# Patient Record
Sex: FEMALE | Race: WHITE | NOT HISPANIC OR LATINO | Employment: OTHER | URBAN - METROPOLITAN AREA
[De-identification: names, ages, dates, MRNs, and addresses within clinical notes are randomized per-mention and may not be internally consistent; named-entity substitution may affect disease eponyms.]

---

## 2023-07-03 DIAGNOSIS — F32.A ANXIETY AND DEPRESSION: Primary | ICD-10-CM

## 2023-07-03 DIAGNOSIS — F41.9 ANXIETY AND DEPRESSION: Primary | ICD-10-CM

## 2023-07-03 RX ORDER — AMOXICILLIN 250 MG/1
CAPSULE ORAL
COMMUNITY
Start: 2023-05-13

## 2023-07-03 RX ORDER — AZITHROMYCIN 250 MG/1
TABLET, FILM COATED ORAL
COMMUNITY
Start: 2022-09-26 | End: 2023-07-03

## 2023-07-03 RX ORDER — CYCLOBENZAPRINE HCL 10 MG
10 TABLET ORAL NIGHTLY
COMMUNITY
Start: 2023-05-23

## 2023-07-03 RX ORDER — IBUPROFEN 600 MG/1
600 TABLET ORAL EVERY 6 HOURS
COMMUNITY
Start: 2023-05-23

## 2023-07-03 RX ORDER — ALBUTEROL SULFATE 90 UG/1
AEROSOL, METERED RESPIRATORY (INHALATION)
COMMUNITY
Start: 2022-10-05

## 2023-07-03 RX ORDER — HYDROCODONE BITARTRATE AND ACETAMINOPHEN 5; 325 MG/1; MG/1
TABLET ORAL
COMMUNITY

## 2023-07-03 RX ORDER — ESCITALOPRAM OXALATE 5 MG/1
5 TABLET ORAL DAILY
Qty: 90 TABLET | Refills: 0 | Status: SHIPPED | OUTPATIENT
Start: 2023-07-03 | End: 2023-10-04 | Stop reason: SDUPTHER

## 2023-07-03 RX ORDER — GLUCOSAMINE HCL 500 MG
TABLET ORAL
COMMUNITY
Start: 2022-05-24

## 2023-07-03 RX ORDER — ESCITALOPRAM OXALATE 5 MG/1
1 TABLET ORAL DAILY
COMMUNITY
Start: 2022-05-23 | End: 2023-07-03 | Stop reason: SDUPTHER

## 2023-07-03 RX ORDER — NITROFURANTOIN 25; 75 MG/1; MG/1
100 CAPSULE ORAL EVERY 12 HOURS
Qty: 10 CAPSULE | Refills: 0 | COMMUNITY
Start: 2022-11-08 | End: 2022-11-13

## 2023-07-03 RX ORDER — PREDNISONE 20 MG/1
2 TABLET ORAL DAILY
COMMUNITY
Start: 2022-10-05

## 2023-07-03 NOTE — TELEPHONE ENCOUNTER
Patient says she has been taking 10mg instead of 5mg of the Lexapro for 3 days. She needs a refill and would like it to be for the new dosage of 10mg, as she has been doing and feeling well on the dosage.

## 2023-08-21 ENCOUNTER — HOSPITAL ENCOUNTER (OUTPATIENT)
Dept: DATA CONVERSION | Facility: HOSPITAL | Age: 43
Discharge: HOME | End: 2023-08-21
Payer: COMMERCIAL

## 2023-08-21 DIAGNOSIS — S52.125A NONDISPLACED FRACTURE OF HEAD OF LEFT RADIUS, INITIAL ENCOUNTER FOR CLOSED FRACTURE: ICD-10-CM

## 2023-08-21 DIAGNOSIS — S42.452A DISPLACED FRACTURE OF LATERAL CONDYLE OF LEFT HUMERUS, INITIAL ENCOUNTER FOR CLOSED FRACTURE: ICD-10-CM

## 2023-10-04 ENCOUNTER — OFFICE VISIT (OUTPATIENT)
Dept: PRIMARY CARE | Facility: CLINIC | Age: 43
End: 2023-10-04
Payer: COMMERCIAL

## 2023-10-04 VITALS
HEART RATE: 74 BPM | OXYGEN SATURATION: 99 % | WEIGHT: 117 LBS | SYSTOLIC BLOOD PRESSURE: 110 MMHG | BODY MASS INDEX: 18.88 KG/M2 | DIASTOLIC BLOOD PRESSURE: 70 MMHG

## 2023-10-04 DIAGNOSIS — Z12.31 ENCOUNTER FOR SCREENING MAMMOGRAM FOR MALIGNANT NEOPLASM OF BREAST: ICD-10-CM

## 2023-10-04 DIAGNOSIS — F32.A ANXIETY AND DEPRESSION: Primary | ICD-10-CM

## 2023-10-04 DIAGNOSIS — Z23 ENCOUNTER FOR IMMUNIZATION: ICD-10-CM

## 2023-10-04 DIAGNOSIS — F41.9 ANXIETY AND DEPRESSION: Primary | ICD-10-CM

## 2023-10-04 PROBLEM — J43.9 EMPHYSEMA LUNG (MULTI): Status: ACTIVE | Noted: 2023-10-04

## 2023-10-04 PROBLEM — H91.90 HEARING LOSS: Status: ACTIVE | Noted: 2023-10-04

## 2023-10-04 PROBLEM — F41.8 MIXED ANXIETY DEPRESSIVE DISORDER: Status: ACTIVE | Noted: 2023-10-04

## 2023-10-04 PROBLEM — G43.909 MIGRAINE HEADACHE: Status: ACTIVE | Noted: 2023-10-04

## 2023-10-04 PROBLEM — E78.5 HYPERLIPIDEMIA: Status: ACTIVE | Noted: 2023-10-04

## 2023-10-04 PROCEDURE — 1036F TOBACCO NON-USER: CPT | Performed by: INTERNAL MEDICINE

## 2023-10-04 PROCEDURE — 99214 OFFICE O/P EST MOD 30 MIN: CPT | Performed by: INTERNAL MEDICINE

## 2023-10-04 PROCEDURE — 90686 IIV4 VACC NO PRSV 0.5 ML IM: CPT | Performed by: INTERNAL MEDICINE

## 2023-10-04 PROCEDURE — 90471 IMMUNIZATION ADMIN: CPT | Performed by: INTERNAL MEDICINE

## 2023-10-04 RX ORDER — ESCITALOPRAM OXALATE 5 MG/1
5 TABLET ORAL DAILY
Qty: 90 TABLET | Refills: 3 | Status: SHIPPED | OUTPATIENT
Start: 2023-10-04

## 2023-10-04 NOTE — PROGRESS NOTES
"Subjective   Patient ID: Robina Kimble is a 42 y.o. female who presents for Follow-up (Patient here for refills).    HPI   \"I feel well\", refill request, inquiring about PAP/HPV results from 6/23 done by her GYN.  Review of Systems  negative  Objective   Pulse 74   Wt 53.1 kg (117 lb)   LMP 09/25/2023 (Approximate)   SpO2 99%   BMI 18.88 kg/m²     Physical Exam  NAD. Cooperative.  Lungs CTA  Heart: RRR      Assessment/Plan   Diagnoses and all orders for this visit:  Anxiety and depression  -     escitalopram (Lexapro) 5 mg tablet; Take 1 tablet (5 mg) by mouth once daily.  Encounter for immunization  Encounter for screening mammogram for malignant neoplasm of breast  -     BI mammo bilateral screening tomosynthesis; Future  Other orders  -     Flu vaccine (IIV4) age 6 months and greater, preservative free    Reviewed and discussed 6/23 GYN results, WNL.     "

## 2023-12-18 ENCOUNTER — ANCILLARY PROCEDURE (OUTPATIENT)
Dept: RADIOLOGY | Facility: CLINIC | Age: 43
End: 2023-12-18
Payer: COMMERCIAL

## 2023-12-18 DIAGNOSIS — Z12.31 ENCOUNTER FOR SCREENING MAMMOGRAM FOR MALIGNANT NEOPLASM OF BREAST: ICD-10-CM

## 2023-12-18 PROCEDURE — 77063 BREAST TOMOSYNTHESIS BI: CPT | Mod: BILATERAL PROCEDURE | Performed by: RADIOLOGY

## 2023-12-18 PROCEDURE — 77067 SCR MAMMO BI INCL CAD: CPT | Mod: BILATERAL PROCEDURE | Performed by: RADIOLOGY

## 2023-12-18 PROCEDURE — 77067 SCR MAMMO BI INCL CAD: CPT

## 2024-01-02 ENCOUNTER — APPOINTMENT (OUTPATIENT)
Dept: OTOLARYNGOLOGY | Facility: HOSPITAL | Age: 44
End: 2024-01-02
Payer: COMMERCIAL

## 2024-01-09 ENCOUNTER — APPOINTMENT (OUTPATIENT)
Dept: OTOLARYNGOLOGY | Facility: CLINIC | Age: 44
End: 2024-01-09
Payer: COMMERCIAL

## 2024-01-17 ENCOUNTER — APPOINTMENT (OUTPATIENT)
Dept: PRIMARY CARE | Facility: CLINIC | Age: 44
End: 2024-01-17
Payer: COMMERCIAL

## 2024-01-17 ENCOUNTER — TELEMEDICINE (OUTPATIENT)
Dept: PRIMARY CARE | Facility: CLINIC | Age: 44
End: 2024-01-17
Payer: COMMERCIAL

## 2024-01-17 DIAGNOSIS — J43.9 PULMONARY EMPHYSEMA, UNSPECIFIED EMPHYSEMA TYPE (MULTI): Primary | ICD-10-CM

## 2024-01-17 PROCEDURE — 99213 OFFICE O/P EST LOW 20 MIN: CPT | Performed by: INTERNAL MEDICINE

## 2024-01-17 NOTE — PROGRESS NOTES
"Subjective   Patient ID: Robina Kimble is a 43 y.o. female who presents for Follow-up (Video visit for referral).    HPI   Recently was diagnosed and treated with respiratory infections, and two episodes of streptococcal tonsillitis, \"I still do not feel well\". Scheduled consult with ENT.  In light of emphysema interested in re-establishing with a Pulmonologist.      Assessment/Plan   Diagnoses and all orders for this visit:  Pulmonary emphysema, unspecified emphysema type (CMS/HCC)  -     Referral to Pulmonology; Future    Discussed and recommended immunizations, Pneumococcal conjugate vaccine (PCV) 20, Pneumococcal polysaccharide vaccine (PPSV or PPV-23) in 8 weeks, RSV vaccine, and the COVID booster update.       "

## 2024-01-24 ENCOUNTER — APPOINTMENT (OUTPATIENT)
Dept: OTOLARYNGOLOGY | Facility: CLINIC | Age: 44
End: 2024-01-24
Payer: COMMERCIAL

## 2024-02-05 ENCOUNTER — APPOINTMENT (OUTPATIENT)
Dept: OTOLARYNGOLOGY | Facility: CLINIC | Age: 44
End: 2024-02-05
Payer: COMMERCIAL

## 2024-02-12 NOTE — PROGRESS NOTES
Patient: Robina Kimble    [id number]  : 1980 -- AGE 43 y.o.    Provider: Camila FITCH [facility]     Location [facility]   Service Date: 24          [facility]  New Visit Note    HISTORY OF PRESENT ILLNESS     The patient's referring provider is: Gwendolyn Gomez, *    HISTORY OF PRESENT ILLNESS   Robina Kimble is a 43 y.o. female who is a former cigarette smoker, currently vapes e-cigarettes (20 pack years), who presents to a [facility] for an initial evaluation for Emphysema.     I have independently interviewed and examined the patient in the office and reviewed available records.    Current History    On today's visit, the patient reports she has a dry cough with occasional chest tightnes, (chest tightness happened more often when she smoked cigarettes). She quit smoking 2 years ago but has been vaping e-cigarettes since. She was prescribed an albuterol inhaler a couple years back for pneumonia but never used it. She denies wheezing, KING, and ER visits for breathing issues.    Previous pulmonary history: Pneumonia. Emphysema    Inhalers/nebulized medications: albuterol    Hospitalization History: She has not been hospitalized over the last year for breathing related problem.    Sleep history: Complains of snoring. Denies apnea, feeling tired during the day, and taking naps during the day.       ALLERGIES AND MEDICATIONS     ALLERGIES  No Known Allergies    MEDICATIONS  Current Outpatient Medications   Medication Sig Dispense Refill    albuterol 90 mcg/actuation inhaler Inhale.      amoxicillin (Amoxil) 250 mg capsule TAKE 1 CAPSULE BY MOUTH THREE TIMES DAILY UNTIL ALL TAKEN.      cholecalciferol, vitamin D3, 75 mcg (3,000 unit) tablet Take by mouth.      cyclobenzaprine (Flexeril) 10 mg tablet Take 1 tablet (10 mg) by mouth once daily at bedtime.      escitalopram (Lexapro) 5 mg tablet Take 1 tablet (5 mg)  by mouth once daily. 90 tablet 3    HYDROcodone-acetaminophen (Norco) 5-325 mg tablet TAKE 1 TABLET BY MOUTH EVERY 4 HOURS FOR 3 DAYS      ibuprofen 600 mg tablet Take 1 tablet (600 mg) by mouth every 6 hours.      predniSONE (Deltasone) 20 mg tablet Take 2 tablets (40 mg) by mouth once daily.       No current facility-administered medications for this visit.         PAST HISTORY     PAST MEDICAL HISTORY  Anxiety/depression  Emphysema    PAST SURGICAL HISTORY  Past Surgical History:   Procedure Laterality Date    OTHER SURGICAL HISTORY  10/02/2019    No history of surgery       IMMUNIZATION HISTORY  Immunization History   Administered Date(s) Administered    Flu vaccine (IIV4), preservative free *Check age/dose* 10/04/2023    Influenza, Injectable, MDCK, preservative free 10/01/2023    Influenza, seasonal, injectable 10/05/2022    Pfizer Purple Cap SARS-CoV-2 03/10/2021, 2021       SOCIAL HISTORY  Tobacco Smokin- 42 y/o -3/4 pack/day - currently vaping e-cigarettes  Illicit drugs: none  Alcohol consumption: socailly  Pets: dog    OCCUPATIONAL/ENVIRONMENTAL HISTORY  Occupation: hair /, chemicals and aerosols.  No known exposure to asbestos, silica, beryllium or inhaled metals.  No exposure to birds or exotic animals.    FAMILY HISTORY  Family History   Problem Relation Name Age of Onset    Breast cancer Maternal Grandmother  60     Maternal grandmother-  lung cancer  No family history of pulmonary disease  No family history of autoimmune disorders.    REVIEW OF SYSTEMS     REVIEW OF SYSTEMS  Review of Systems    Constitutional: No fever, no chills, no night sweats.    Eyes: No double vision, no floaters, no dry eyes.   ENT: See HPI.   Neck: No neck stiffness.  Cardiovascular: No sharp chest pain, no heart racing, no leg swelling.  Respiratory: as noted in HPI.   Gastrointestinal: No nausea, no vomiting, no diarrhea.   Musculoskeletal: No joint pain, no back pain.   Integumentary: No  rashes or sores.  Neurological: No dizziness, no headaches. Sleeping well.  Psychiatric: No mood changes.   Endocrine: No hot flashes, no cold intolerance, weight is stable.  Hematologic: No easy bruising or bleeding.    PHYSICAL EXAM     VITAL SIGNS:   Vitals:    02/14/24 0912   BP: 114/80   Pulse: 85   Resp: 18   Temp: 36.7 °C (98.1 °F)   SpO2: 99%          CURRENT WEIGHT: Body mass index is 20.07 kg/m².    PREVIOUS WEIGHTS:  Wt Readings from Last 3 Encounters:   10/04/23 53.1 kg (117 lb)   06/21/23 53.1 kg (117 lb)   12/14/22 53.6 kg (118 lb 3 oz)       Physical Exam    Constitutional: General appearance: Alert and oriented.  No acute distress. Well developed, well nourished.  Head and face: Symmetric  ENT: external inspection of ear and nose normal. No intranasal polyps. No oropharyngeal exudates.    Oropharynx: normal   Neck: supple, no lymphadenopathy  Pulmonary: Chest is normal. No increased work of breathing or signs of respiratory distress. Clear to auscultation bilaterally - no crackles, wheezing, or rhonchi.   Cardiovascular: Heart rate and rhythm normal. Normal S1, S2 - no murmurs, gallops, or pericardial rub.   Abdomen: Soft, non tender, +BS  Extremities: No edema. No clubbing or cyanosis of the fingernails.    Neurologic: Moves all four extremities   MSK: Normal movements of extremities. Gait normal   Psychiatric: Intact judgement and insight.    RESULTS/DATA     Pulmonary Function Test Results     None on record    Chest Radiograph     IMPRESSION:    No lung infiltrate.  Focal opacity in the right lower zone of undetermined etiology.  This  might be small infiltrate or atelectasis but a nodule is not excluded.    Follow-up is recommended with nipple marker films    Incidental Finding:  Follow-up  Acuity: Incidental  Finding: Indeterminant appearing incidentally detected nodular lung  density on CXR  Recommendation: XR CHEST 2V FRONTAL/LAT  Time Frame: in 4-6 weeks    COMMUNICATION:? Results will be  communicated with the ordering provider  via Epic staff message by [employer] within 2 business days  of report finalization.    : PSCB    Transcribe Date/Time: Sep 26 2022 10:28A    Dictated by : RUDY SETHI MD    This examination was interpreted and the report reviewed and  electronically signed by:  RUDY SETHI MD on Sep 26 2022 10:31AM  EST  Narrative    * * *Final Report* * *    DATE OF EXAM: Sep 26 2022  9:37AM      LYX   5291  -  XR CHEST 2V FRONTAL/LAT  / ACCESSION #  [id number]    PROCEDURE REASON: Acute cough        * * * * Physician Interpretation * * * *     EXAMINATION:  CHEST RADIOGRAPH (2 VIEW FRONTAL & LATERAL)    CLINICAL HISTORY: Acute cough  MQ:  [id number]    EXAM DATE/TIME:  9/26/2022 9:37 AM    COMPARISON:  No relevant prior studies available.      RESULT:    Lines, tubes, and devices:  None.    Lungs and pleura:  No consolidation. No lung mass. No pleural effusion.  No pneumothorax.  Symmetric nodular opacities in the lower zones consistent with nipple  shadow.  Additionally there is another nodular opacity in the right lower  zone inferior to the right nipple shadow.    Cardiomediastinal silhouette:  Normal cardiomediastinal silhouette.    Bones and soft tissues:  Unremarkable.  Chest CT Scan     Narrative & Impression  MRN: [id number]  Patient Name: MACY GALINDO     STUDY:  CT CHEST W CONTRAST;  10/17/2022 8:52 am     INDICATION:  .  F17.200: Nicotine dependence R93.89: Abnormal chest xray.     COMPARISON:  None.     ACCESSION NUMBER(S):  [id number]     ORDERING CLINICIAN:  COOKIE MONROY     TECHNIQUE:  Helical data acquisition of the chest was obtained  without IV  contrast material.  Images were reformatted in axial, coronal, and  sagittal planes.     FINDINGS:  Emphysema and findings of smoking related airway disease with  bronchial thickening. Mild apical scarring. No suspicious nodules. No  pneumothorax. No pleural effusion. No pericardial effusion.  "No  aneurysm. No adenopathy.     IMPRESSION:  1.  Emphysema. No evidence of infiltrate or focal findings. No  suspicious pulmonary nodules. Follow-up is advised if the patient  meets criteria for lung cancer screening CT program.      Echocardiogram     No results found       Labwork   Complete Blood Count  Lab Results   Component Value Date    WBC 6.2 05/23/2022    HGB 14.2 05/23/2022    HCT 41.1 05/23/2022    MCV 97 05/23/2022     05/23/2022       Peripheral Eosinophil Count/Percentage:   Eosinophils Absolute (x10E9/L)   Date Value   09/01/2020 0.00     Eosinophils % (%)   Date Value   09/01/2020 0.0       Serum Immunoglobulin E:    No results found for: \"IGE\"     ASSESSMENT/PLAN   Ms. Kimble is a 43 y.o. female, who is a former cigarette smoker, currently vapes nicotine (20 pack years), who presents to a [facility] for an initial evaluation for Emphysema.     Problem List and Orders  Problem List Items Addressed This Visit       Emphysema lung (CMS/[facility]) - Primary    Relevant Orders    Complete Pulmonary Function Test Pre/Post Bronchodialator (Spirometry Pre/Post/DLCO/Lung Volumes)       Assessment and Plan / Recommendations:  Problem List Items Addressed This Visit    None     Emphysema  - START albuterol HFA every 4-6 hours as needed for shortness of breath  -Will get PFTs  -Will get A1AT genotype    # Smoking cessation: Patient is currently smoking - vaping e-cigarettes  - >5 minutes smoking cessation counseling   - offered pharmacologic options to assist with quitting - interested in Accu Laser treatment    I strongly recommend that you quit smoking. I recommend working with our smoking cessation clinic [phone] as this has been shown to help people quit.   You can also call the [place] quits hotline at 9-162-QUIT-NOW.     Follow up in 4 weeks or sooner if needed.    If you have any questions please call the office [phone]    Thank you for visiting the " Pulmonary clinic today!   Camila Taylor CNP  [phone]

## 2024-02-14 ENCOUNTER — OFFICE VISIT (OUTPATIENT)
Dept: PULMONOLOGY | Facility: CLINIC | Age: 44
End: 2024-02-14
Payer: COMMERCIAL

## 2024-02-14 VITALS
OXYGEN SATURATION: 99 % | HEART RATE: 85 BPM | SYSTOLIC BLOOD PRESSURE: 114 MMHG | WEIGHT: 120.6 LBS | TEMPERATURE: 98.1 F | BODY MASS INDEX: 20.09 KG/M2 | RESPIRATION RATE: 18 BRPM | HEIGHT: 65 IN | DIASTOLIC BLOOD PRESSURE: 80 MMHG

## 2024-02-14 DIAGNOSIS — J43.9 PULMONARY EMPHYSEMA, UNSPECIFIED EMPHYSEMA TYPE (MULTI): Primary | ICD-10-CM

## 2024-02-14 DIAGNOSIS — F17.210 CIGARETTE NICOTINE DEPENDENCE WITHOUT COMPLICATION: ICD-10-CM

## 2024-02-14 PROCEDURE — 99406 BEHAV CHNG SMOKING 3-10 MIN: CPT

## 2024-02-14 PROCEDURE — 99204 OFFICE O/P NEW MOD 45 MIN: CPT

## 2024-02-14 ASSESSMENT — COLUMBIA-SUICIDE SEVERITY RATING SCALE - C-SSRS
2. HAVE YOU ACTUALLY HAD ANY THOUGHTS OF KILLING YOURSELF?: NO
1. IN THE PAST MONTH, HAVE YOU WISHED YOU WERE DEAD OR WISHED YOU COULD GO TO SLEEP AND NOT WAKE UP?: NO
6. HAVE YOU EVER DONE ANYTHING, STARTED TO DO ANYTHING, OR PREPARED TO DO ANYTHING TO END YOUR LIFE?: NO

## 2024-02-14 ASSESSMENT — PATIENT HEALTH QUESTIONNAIRE - PHQ9
1. LITTLE INTEREST OR PLEASURE IN DOING THINGS: NOT AT ALL
SUM OF ALL RESPONSES TO PHQ9 QUESTIONS 1 AND 2: 0
2. FEELING DOWN, DEPRESSED OR HOPELESS: NOT AT ALL

## 2024-02-14 ASSESSMENT — ENCOUNTER SYMPTOMS
OCCASIONAL FEELINGS OF UNSTEADINESS: 0
DEPRESSION: 0
LOSS OF SENSATION IN FEET: 0

## 2024-03-07 ENCOUNTER — TELEPHONE (OUTPATIENT)
Dept: SLEEP MEDICINE | Facility: CLINIC | Age: 44
End: 2024-03-07
Payer: COMMERCIAL

## 2024-03-11 NOTE — TELEPHONE ENCOUNTER
OV with Camila Taylor CNP has been canceled.    Patient will need to call the office to schedule FU once PFT testing is completed.

## 2024-03-13 ENCOUNTER — APPOINTMENT (OUTPATIENT)
Dept: PULMONOLOGY | Facility: CLINIC | Age: 44
End: 2024-03-13
Payer: COMMERCIAL

## 2024-03-27 ENCOUNTER — HOSPITAL ENCOUNTER (OUTPATIENT)
Dept: RESPIRATORY THERAPY | Facility: HOSPITAL | Age: 44
Discharge: HOME | End: 2024-03-27
Payer: COMMERCIAL

## 2024-03-27 DIAGNOSIS — J43.9 PULMONARY EMPHYSEMA, UNSPECIFIED EMPHYSEMA TYPE (MULTI): ICD-10-CM

## 2024-03-27 PROCEDURE — 94060 EVALUATION OF WHEEZING: CPT | Performed by: INTERNAL MEDICINE

## 2024-03-27 PROCEDURE — 94726 PLETHYSMOGRAPHY LUNG VOLUMES: CPT

## 2024-03-27 PROCEDURE — 94729 DIFFUSING CAPACITY: CPT | Performed by: INTERNAL MEDICINE

## 2024-03-27 PROCEDURE — 94726 PLETHYSMOGRAPHY LUNG VOLUMES: CPT | Performed by: INTERNAL MEDICINE

## 2024-04-17 ENCOUNTER — APPOINTMENT (OUTPATIENT)
Dept: PRIMARY CARE | Facility: CLINIC | Age: 44
End: 2024-04-17
Payer: COMMERCIAL

## 2024-11-25 NOTE — PROGRESS NOTES
"Robina Kimble is a 44 y.o. here for well woman visit    HPI:   Sex is ok.  Sleeping problems, wakes up 1000 degrees  Cycles are regular. Periods are heavier but shorter. Having hot flashes, brain fog, constipation. Lexapro was helpful in the past.   Colonoscopy: has done  GynHx: LEEP 2002  Pap Hx: normal paps following LEEP, last done 2023  Social History     Substance and Sexual Activity   Sexual Activity Yes     Current contraception: Withdrawal  STIs: HPV, long ago    Exercise: yes  Past med hx and past surg hx reviewed and notable for: perimenopause    Objective   /60   Ht 1.651 m (5' 5\")   Wt 52.6 kg (116 lb)   LMP 11/29/2024   BMI 19.30 kg/m²   Physical Exam  Constitutional:       Appearance: She is normal weight. She is not ill-appearing.   Neck:      Comments: No thyroid enlargement  Abdominal:      Palpations: Abdomen is soft.      Tenderness: There is no abdominal tenderness.   Genitourinary:     General: Normal vulva.      Vagina: Normal.      Adnexa: Right adnexa normal and left adnexa normal.      Rectum: Normal.   Musculoskeletal:      Cervical back: Neck supple.   Lymphadenopathy:      Cervical: No cervical adenopathy.   Skin:     General: Skin is warm and dry.   Psychiatric:         Mood and Affect: Mood normal.          Assessment and Plan:  Problem List Items Addressed This Visit    None  Visit Diagnoses       Encounter for screening mammogram for malignant neoplasm of breast    -  Primary    Relevant Orders    BI mammo bilateral screening tomosynthesis    Anxiety and depression        Relevant Medications    escitalopram (Lexapro) 5 mg tablet           Orders Placed This Encounter   Procedures    BI mammo bilateral screening tomosynthesis     Standing Status:   Future     Standing Expiration Date:   1/4/2026     Order Specific Question:   Is the patient pregnant?     Answer:   No     Order Specific Question:   Reason for exam:     Answer:   Screening     Order Specific Question:   " Radiologist to Determine Optimal Study     Answer:   Yes     Order Specific Question:   Release result to "[employer]     Answer:   Immediate [1]     Order Specific Question:   Is this exam part of a Research Study? If Yes, link this order to the research study     Answer:   No      44 year old female with anxiety presenting for annual gyn exam, perimenopausal symptoms. Mammogram ordered for screening. Next pap is due 2026. Restarted Lexapro 5 mg for anxiety. Follow up next year or sooner if perimenopausal symptoms become unmanageable.     Dottie Llanos, DO  Family Medicine Resident, PGY2

## 2024-12-04 ENCOUNTER — APPOINTMENT (OUTPATIENT)
Dept: OBSTETRICS AND GYNECOLOGY | Facility: CLINIC | Age: 44
End: 2024-12-04
Payer: COMMERCIAL

## 2024-12-04 VITALS
WEIGHT: 116 LBS | SYSTOLIC BLOOD PRESSURE: 104 MMHG | BODY MASS INDEX: 19.33 KG/M2 | DIASTOLIC BLOOD PRESSURE: 60 MMHG | HEIGHT: 65 IN

## 2024-12-04 DIAGNOSIS — F41.9 ANXIETY AND DEPRESSION: ICD-10-CM

## 2024-12-04 DIAGNOSIS — F32.A ANXIETY AND DEPRESSION: ICD-10-CM

## 2024-12-04 DIAGNOSIS — Z12.31 ENCOUNTER FOR SCREENING MAMMOGRAM FOR MALIGNANT NEOPLASM OF BREAST: Primary | ICD-10-CM

## 2024-12-04 PROCEDURE — 99396 PREV VISIT EST AGE 40-64: CPT | Performed by: OBSTETRICS & GYNECOLOGY

## 2024-12-04 RX ORDER — ESCITALOPRAM OXALATE 5 MG/1
5 TABLET ORAL DAILY
Qty: 90 TABLET | Refills: 3 | Status: SHIPPED | OUTPATIENT
Start: 2024-12-04

## 2024-12-04 NOTE — PROGRESS NOTES
I saw and evaluated the patient. I personally obtained the key and critical portions of the history and physical exam or was physically present for key and critical portions performed by the resident/fellow. I reviewed the resident/fellow's documentation and discussed the patient with the resident/fellow. I agree with the resident/fellow's medical decision making as documented in the note.    Sandi Gonzalez MD

## 2025-01-08 ENCOUNTER — APPOINTMENT (OUTPATIENT)
Dept: RADIOLOGY | Facility: CLINIC | Age: 45
End: 2025-01-08
Payer: COMMERCIAL

## 2025-02-05 ENCOUNTER — HOSPITAL ENCOUNTER (OUTPATIENT)
Dept: RADIOLOGY | Facility: CLINIC | Age: 45
Discharge: HOME | End: 2025-02-05
Payer: COMMERCIAL

## 2025-02-05 VITALS — WEIGHT: 116 LBS | HEIGHT: 65 IN | BODY MASS INDEX: 19.33 KG/M2

## 2025-02-05 DIAGNOSIS — Z12.31 ENCOUNTER FOR SCREENING MAMMOGRAM FOR MALIGNANT NEOPLASM OF BREAST: ICD-10-CM

## 2025-02-05 PROCEDURE — 77063 BREAST TOMOSYNTHESIS BI: CPT | Performed by: RADIOLOGY

## 2025-02-05 PROCEDURE — 77067 SCR MAMMO BI INCL CAD: CPT | Performed by: RADIOLOGY

## 2025-02-05 PROCEDURE — 77067 SCR MAMMO BI INCL CAD: CPT
